# Patient Record
(demographics unavailable — no encounter records)

---

## 2025-05-15 NOTE — ASSESSMENT
[FreeTextEntry1] : 1) Fatigue - possibly multifactorial -  - r.o anemia, check CBC, check TSH  - Sleep eval  2) HTN  - refuses to start med - salt restriction adv  - Risks of high BP discussed - f/u in 3 month- look log   3) sleep disturbance - has night terrors and moves a lot in sleep- sleep specialist eval recommended

## 2025-05-15 NOTE — PHYSICAL EXAM
[No Acute Distress] : no acute distress [Well Nourished] : well nourished [Normal Sclera/Conjunctiva] : normal sclera/conjunctiva [Normal] : soft, non-tender, non-distended, no masses palpated, no HSM and normal bowel sounds

## 2025-05-15 NOTE — HISTORY OF PRESENT ILLNESS
[FreeTextEntry1] : here for evaluation of fatigue  has had it x 2 week- reports that she has been sleeping more  periods are regular  but still very heavy - does get some hot flashes on and off   mild itching on neck - 3 days- no new products , no rash  does gilbert mauris[employer] necklace - many yrs  she denies any fever  no chills  mild snoring at night - pt reports that she has  been told that she flaps around at night